# Patient Record
Sex: MALE | Race: WHITE | NOT HISPANIC OR LATINO | Employment: FULL TIME
[De-identification: names, ages, dates, MRNs, and addresses within clinical notes are randomized per-mention and may not be internally consistent; named-entity substitution may affect disease eponyms.]

---

## 2019-11-04 ENCOUNTER — HEALTH MAINTENANCE LETTER (OUTPATIENT)
Age: 34
End: 2019-11-04

## 2020-11-22 ENCOUNTER — HEALTH MAINTENANCE LETTER (OUTPATIENT)
Age: 35
End: 2020-11-22

## 2021-09-18 ENCOUNTER — HEALTH MAINTENANCE LETTER (OUTPATIENT)
Age: 36
End: 2021-09-18

## 2021-12-05 ASSESSMENT — ANXIETY QUESTIONNAIRES
6. BECOMING EASILY ANNOYED OR IRRITABLE: NOT AT ALL
3. WORRYING TOO MUCH ABOUT DIFFERENT THINGS: NOT AT ALL
5. BEING SO RESTLESS THAT IT IS HARD TO SIT STILL: NOT AT ALL
7. FEELING AFRAID AS IF SOMETHING AWFUL MIGHT HAPPEN: NOT AT ALL
GAD7 TOTAL SCORE: 1
2. NOT BEING ABLE TO STOP OR CONTROL WORRYING: NOT AT ALL
1. FEELING NERVOUS, ANXIOUS, OR ON EDGE: SEVERAL DAYS
4. TROUBLE RELAXING: NOT AT ALL
7. FEELING AFRAID AS IF SOMETHING AWFUL MIGHT HAPPEN: NOT AT ALL

## 2021-12-05 ASSESSMENT — PATIENT HEALTH QUESTIONNAIRE - PHQ9
SUM OF ALL RESPONSES TO PHQ QUESTIONS 1-9: 0
SUM OF ALL RESPONSES TO PHQ QUESTIONS 1-9: 0

## 2021-12-06 ENCOUNTER — VIRTUAL VISIT (OUTPATIENT)
Dept: FAMILY MEDICINE | Facility: CLINIC | Age: 36
End: 2021-12-06
Payer: COMMERCIAL

## 2021-12-06 DIAGNOSIS — F41.9 ANXIETY: Primary | ICD-10-CM

## 2021-12-06 DIAGNOSIS — I45.6 WPW (WOLFF-PARKINSON-WHITE SYNDROME): ICD-10-CM

## 2021-12-06 PROBLEM — K21.9 GASTRIC REFLUX: Status: ACTIVE | Noted: 2019-05-10

## 2021-12-06 PROCEDURE — 99203 OFFICE O/P NEW LOW 30 MIN: CPT | Mod: GT | Performed by: NURSE PRACTITIONER

## 2021-12-06 RX ORDER — BUSPIRONE HYDROCHLORIDE 10 MG/1
10 TABLET ORAL 2 TIMES DAILY
COMMUNITY
Start: 2020-07-22 | End: 2021-12-06

## 2021-12-06 RX ORDER — PROPRANOLOL HYDROCHLORIDE 10 MG/1
10 TABLET ORAL DAILY PRN
Qty: 90 TABLET | Refills: 3 | Status: SHIPPED | OUTPATIENT
Start: 2021-12-06 | End: 2023-04-25

## 2021-12-06 RX ORDER — PROPRANOLOL HYDROCHLORIDE 10 MG/1
10 TABLET ORAL
COMMUNITY
Start: 2021-02-18 | End: 2023-04-25

## 2021-12-06 RX ORDER — BUSPIRONE HYDROCHLORIDE 10 MG/1
10 TABLET ORAL 2 TIMES DAILY
Qty: 180 TABLET | Refills: 3 | Status: SHIPPED | OUTPATIENT
Start: 2021-12-06 | End: 2022-12-02

## 2021-12-06 RX ORDER — PROPRANOLOL HYDROCHLORIDE 10 MG/1
10 TABLET ORAL 2 TIMES DAILY PRN
Qty: 180 TABLET | Refills: 3 | Status: SHIPPED | OUTPATIENT
Start: 2021-12-06 | End: 2021-12-06

## 2021-12-06 RX ORDER — PROPRANOLOL HYDROCHLORIDE 10 MG/1
10 TABLET ORAL 2 TIMES DAILY
COMMUNITY
Start: 2020-08-12 | End: 2021-12-06

## 2021-12-06 ASSESSMENT — PATIENT HEALTH QUESTIONNAIRE - PHQ9: SUM OF ALL RESPONSES TO PHQ QUESTIONS 1-9: 0

## 2021-12-06 ASSESSMENT — ANXIETY QUESTIONNAIRES: GAD7 TOTAL SCORE: 1

## 2021-12-06 NOTE — PROGRESS NOTES
Avery is a 36 year old who is being evaluated via a billable video visit.      How would you like to obtain your AVS? MyChart  If the video visit is dropped, the invitation should be resent by: Text to cell phone: [phone]  Will anyone else be joining your video visit? No      Video Start Time: 2:07 PM    Assessment & Plan     WPW (Sylvie-Parkinson-White syndrome)  Ablation in 2019.  No recurring symptoms.     Anxiety  Chronic, well controlled.  Continue current medications.   - propranolol (INDERAL) 10 MG tablet; Take 1 tablet (10 mg) by mouth 2 times daily as needed (anxiety)  - busPIRone (BUSPAR) 10 MG tablet; Take 1 tablet (10 mg) by mouth 2 times daily               Return in about 4 weeks (around 1/3/2022) for Preventive Visit, In-Clinic Visit.    [facility]    Cecy Varela is a 36 year old who presents for the following health issues     History of Present Illness       Mental Health Follow-up:  Patient presents to follow-up on Anxiety.    Patient's anxiety since last visit has been:  Good  The patient is not having other symptoms associated with anxiety.  Any significant life events: No  Patient is not feeling anxious or having panic attacks.  Patient has no concerns about alcohol or drug use.     Social History  Tobacco Use    Smoking status: Not on file    Smokeless tobacco: Not on file  Alcohol use: Not on file  Drug use: Not on file      Today's PHQ-9         PHQ-9 Total Score:     (P) 0   PHQ-9 Q9 Thoughts of better off dead/self-harm past 2 weeks :   (P) Not at all   Thoughts of suicide or self harm:      Self-harm Plan:        Self-harm Action:          Safety concerns for self or others:           He eats 2-3 servings of fruits and vegetables daily.He consumes 0 sweetened beverage(s) daily.He exercises with enough effort to increase his heart rate 30 to 60 minutes per day.  He exercises with enough effort to increase his heart rate 6  days per week.   He is taking medications regularly.       Anxiety Follow-Up    How are you doing with your anxiety since your last visit? Improved     Are you having other symptoms that might be associated with anxiety? No    Have you had a significant life event? No     Are you feeling depressed? No    Do you have any concerns with your use of alcohol or other drugs? No    Social History     Tobacco Use     Smoking status: Never Smoker     Smokeless tobacco: Former User     Tobacco comment: Date is an estimate for smokeless.   Vaping Use     Vaping Use: Never used   Substance Use Topics     Alcohol use: Yes     Comment: very light      Drug use: Never     WON-7 SCORE 12/5/2021   Total Score 1 (minimal anxiety)   Total Score 1     PHQ 12/5/2021   PHQ-9 Total Score 0   Q9: Thoughts of better off dead/self-harm past 2 weeks Not at all     Last PHQ-9 12/5/2021   1.  Little interest or pleasure in doing things 0   2.  Feeling down, depressed, or hopeless 0   3.  Trouble falling or staying asleep, or sleeping too much 0   4.  Feeling tired or having little energy 0   5.  Poor appetite or overeating 0   6.  Feeling bad about yourself 0   7.  Trouble concentrating 0   8.  Moving slowly or restless 0   Q9: Thoughts of better off dead/self-harm past 2 weeks 0   PHQ-9 Total Score 0     WON-7  12/5/2021   1. Feeling nervous, anxious, or on edge 1   2. Not being able to stop or control worrying 0   3. Worrying too much about different things 0   4. Trouble relaxing 0   5. Being so restless that it is hard to sit still 0   6. Becoming easily annoyed or irritable 0   7. Feeling afraid, as if something awful might happen 0   WON-7 Total Score 1     Previous care in [place].  Moved back to [place] last year.  Diagnosed with anxiety in the past.  Taking buspar and propranolol.  He finds anxiety is well controlled with medication.  He only uses propranolol during the week. Has been given propranolol to use twice daily, but only using  "once a day.  Needs refills.       Review of Systems   Constitutional, HEENT, cardiovascular, pulmonary, gi and gu systems are negative, except as otherwise noted.      Objective    Vitals - Patient Reported  Weight (Patient Reported): 88.5 kg (195 lb)  Height (Patient Reported): 185.4 cm (6' 1\")  BMI (Based on Pt Reported Ht/Wt): 25.73  Pain Score: No Pain (0)      Vitals:  No vitals were obtained today due to virtual visit.    Physical Exam   GENERAL: Healthy, alert and no distress  EYES: Eyes grossly normal to inspection.  No discharge or erythema, or obvious scleral/conjunctival abnormalities.  RESP: No audible wheeze, cough, or visible cyanosis.  No visible retractions or increased work of breathing.    SKIN: Visible skin clear. No significant rash, abnormal pigmentation or lesions.  NEURO: Cranial nerves grossly intact.  Mentation and speech appropriate for age.  PSYCH: Mentation appears normal, affect normal/bright, judgement and insight intact, normal speech and appearance well-groomed.    No results found for this or any previous visit (from the past 24 hour(s)).            Video-Visit Details    Type of service:  Video Visit    Video End Time:2:13 PM    Originating Location (pt. Location): Home    Distant Location (provider location):  [facility]     Platform used for Video Visit: Pedro  "

## 2022-01-08 ENCOUNTER — HEALTH MAINTENANCE LETTER (OUTPATIENT)
Age: 37
End: 2022-01-08

## 2022-11-20 ENCOUNTER — HEALTH MAINTENANCE LETTER (OUTPATIENT)
Age: 37
End: 2022-11-20

## 2022-12-02 DIAGNOSIS — F41.9 ANXIETY: ICD-10-CM

## 2022-12-02 RX ORDER — BUSPIRONE HYDROCHLORIDE 10 MG/1
TABLET ORAL
Qty: 180 TABLET | Refills: 0 | Status: SHIPPED | OUTPATIENT
Start: 2022-12-02 | End: 2023-03-02

## 2022-12-27 ENCOUNTER — OFFICE VISIT (OUTPATIENT)
Dept: FAMILY MEDICINE | Facility: CLINIC | Age: 37
End: 2022-12-27
Payer: COMMERCIAL

## 2022-12-27 VITALS
DIASTOLIC BLOOD PRESSURE: 81 MMHG | OXYGEN SATURATION: 99 % | BODY MASS INDEX: 26.55 KG/M2 | HEIGHT: 72 IN | RESPIRATION RATE: 15 BRPM | SYSTOLIC BLOOD PRESSURE: 140 MMHG | HEART RATE: 65 BPM | WEIGHT: 196 LBS | TEMPERATURE: 97.1 F

## 2022-12-27 DIAGNOSIS — R10.12 LUQ ABDOMINAL PAIN: ICD-10-CM

## 2022-12-27 DIAGNOSIS — K29.00 ACUTE GASTRITIS WITHOUT HEMORRHAGE, UNSPECIFIED GASTRITIS TYPE: Primary | ICD-10-CM

## 2022-12-27 DIAGNOSIS — R10.13 EPIGASTRIC PAIN: ICD-10-CM

## 2022-12-27 PROCEDURE — 99213 OFFICE O/P EST LOW 20 MIN: CPT | Performed by: FAMILY MEDICINE

## 2022-12-27 ASSESSMENT — PAIN SCALES - GENERAL: PAINLEVEL: MODERATE PAIN (4)
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
## 2023-03-02 DIAGNOSIS — F41.9 ANXIETY: ICD-10-CM

## 2023-03-02 RX ORDER — PROPRANOLOL HYDROCHLORIDE 10 MG/1
10 TABLET ORAL 2 TIMES DAILY PRN
Qty: 60 TABLET | Refills: 0 | Status: SHIPPED | OUTPATIENT
Start: 2023-03-02 | End: 2023-03-29

## 2023-03-02 RX ORDER — BUSPIRONE HYDROCHLORIDE 10 MG/1
10 TABLET ORAL 2 TIMES DAILY
Qty: 60 TABLET | Refills: 0 | Status: SHIPPED | OUTPATIENT
Start: 2023-03-02 | End: 2023-03-29

## 2023-03-02 NOTE — TELEPHONE ENCOUNTER
Was due for in person appt but has not been seen yet.  Limited refill given; Must have in person appt for refills as he has never been seen in clinic by me.    Yamilka Mckee CNP

## 2023-03-02 NOTE — TELEPHONE ENCOUNTER
Routing refill request to provider for review/approval because:  Patient needs to be seen because it has been more than 1 year since last office visit.  Failing bp    Tori Schaefer RN

## 2023-03-28 DIAGNOSIS — F41.9 ANXIETY: ICD-10-CM

## 2023-03-29 RX ORDER — BUSPIRONE HYDROCHLORIDE 10 MG/1
10 TABLET ORAL 2 TIMES DAILY
Qty: 60 TABLET | Refills: 0 | Status: SHIPPED | OUTPATIENT
Start: 2023-03-29 | End: 2023-04-28

## 2023-03-29 RX ORDER — PROPRANOLOL HYDROCHLORIDE 10 MG/1
10 TABLET ORAL 2 TIMES DAILY PRN
Qty: 60 TABLET | Refills: 0 | Status: SHIPPED | OUTPATIENT
Start: 2023-03-29 | End: 2023-04-25

## 2023-03-29 NOTE — TELEPHONE ENCOUNTER
Last refill until visit   Prescription approved per [facility] Refill Protocol.  Nayeli Ball RN, BSN  [facility]

## 2023-04-15 ENCOUNTER — HEALTH MAINTENANCE LETTER (OUTPATIENT)
Age: 38
End: 2023-04-15

## 2023-04-25 ENCOUNTER — OFFICE VISIT (OUTPATIENT)
Dept: FAMILY MEDICINE | Facility: CLINIC | Age: 38
End: 2023-04-25
Payer: COMMERCIAL

## 2023-04-25 VITALS
SYSTOLIC BLOOD PRESSURE: 124 MMHG | HEART RATE: 56 BPM | OXYGEN SATURATION: 99 % | RESPIRATION RATE: 16 BRPM | BODY MASS INDEX: 26.55 KG/M2 | DIASTOLIC BLOOD PRESSURE: 77 MMHG | HEIGHT: 72 IN | WEIGHT: 196 LBS | TEMPERATURE: 97.8 F

## 2023-04-25 DIAGNOSIS — Z11.59 NEED FOR HEPATITIS C SCREENING TEST: ICD-10-CM

## 2023-04-25 DIAGNOSIS — Z11.4 SCREENING FOR HIV (HUMAN IMMUNODEFICIENCY VIRUS): ICD-10-CM

## 2023-04-25 DIAGNOSIS — F41.8 PERFORMANCE ANXIETY: ICD-10-CM

## 2023-04-25 DIAGNOSIS — Z00.00 ROUTINE GENERAL MEDICAL EXAMINATION AT A HEALTH CARE FACILITY: Primary | ICD-10-CM

## 2023-04-25 DIAGNOSIS — F41.9 ANXIETY: ICD-10-CM

## 2023-04-25 DIAGNOSIS — I45.6 WPW (WOLFF-PARKINSON-WHITE SYNDROME): ICD-10-CM

## 2023-04-25 DIAGNOSIS — Z13.1 SCREENING FOR DIABETES MELLITUS: ICD-10-CM

## 2023-04-25 LAB
CHOLEST SERPL-MCNC: 143 MG/DL
FASTING STATUS PATIENT QL REPORTED: YES
GLUCOSE SERPL-MCNC: 83 MG/DL (ref 70–99)
HCV AB SERPL QL IA: NONREACTIVE
HDLC SERPL-MCNC: 45 MG/DL
HIV 1+2 AB+HIV1 P24 AG SERPL QL IA: NONREACTIVE
LDLC SERPL CALC-MCNC: 58 MG/DL
NONHDLC SERPL-MCNC: 98 MG/DL
TRIGL SERPL-MCNC: 198 MG/DL

## 2023-04-25 PROCEDURE — 90471 IMMUNIZATION ADMIN: CPT | Performed by: STUDENT IN AN ORGANIZED HEALTH CARE EDUCATION/TRAINING PROGRAM

## 2023-04-25 PROCEDURE — 36415 COLL VENOUS BLD VENIPUNCTURE: CPT | Performed by: STUDENT IN AN ORGANIZED HEALTH CARE EDUCATION/TRAINING PROGRAM

## 2023-04-25 PROCEDURE — 80061 LIPID PANEL: CPT | Performed by: STUDENT IN AN ORGANIZED HEALTH CARE EDUCATION/TRAINING PROGRAM

## 2023-04-25 PROCEDURE — 99395 PREV VISIT EST AGE 18-39: CPT | Mod: 25 | Performed by: STUDENT IN AN ORGANIZED HEALTH CARE EDUCATION/TRAINING PROGRAM

## 2023-04-25 PROCEDURE — 86803 HEPATITIS C AB TEST: CPT | Performed by: STUDENT IN AN ORGANIZED HEALTH CARE EDUCATION/TRAINING PROGRAM

## 2023-04-25 PROCEDURE — 90715 TDAP VACCINE 7 YRS/> IM: CPT | Performed by: STUDENT IN AN ORGANIZED HEALTH CARE EDUCATION/TRAINING PROGRAM

## 2023-04-25 PROCEDURE — 82947 ASSAY GLUCOSE BLOOD QUANT: CPT | Performed by: STUDENT IN AN ORGANIZED HEALTH CARE EDUCATION/TRAINING PROGRAM

## 2023-04-25 PROCEDURE — 99213 OFFICE O/P EST LOW 20 MIN: CPT | Mod: 25 | Performed by: STUDENT IN AN ORGANIZED HEALTH CARE EDUCATION/TRAINING PROGRAM

## 2023-04-25 PROCEDURE — 87389 HIV-1 AG W/HIV-1&-2 AB AG IA: CPT | Performed by: STUDENT IN AN ORGANIZED HEALTH CARE EDUCATION/TRAINING PROGRAM

## 2023-04-25 RX ORDER — PROPRANOLOL HYDROCHLORIDE 10 MG/1
10 TABLET ORAL 2 TIMES DAILY PRN
Qty: 180 TABLET | Refills: 3 | Status: SHIPPED | OUTPATIENT
Start: 2023-04-25 | End: 2024-04-24

## 2023-04-25 RX ORDER — BUSPIRONE HYDROCHLORIDE 15 MG/1
15 TABLET ORAL 2 TIMES DAILY
Qty: 180 TABLET | Refills: 0 | Status: SHIPPED | OUTPATIENT
Start: 2023-04-25 | End: 2023-08-03

## 2023-04-25 RX ORDER — PROPRANOLOL HYDROCHLORIDE 20 MG/1
20 TABLET ORAL SEE ADMIN INSTRUCTIONS
Qty: 30 TABLET | Refills: 1 | Status: SHIPPED | OUTPATIENT
Start: 2023-04-25 | End: 2023-06-16

## 2023-04-25 RX ORDER — BUSPIRONE HYDROCHLORIDE 10 MG/1
10 TABLET ORAL 2 TIMES DAILY
Qty: 60 TABLET | Refills: 0 | Status: CANCELLED | OUTPATIENT
Start: 2023-04-25

## 2023-04-25 ASSESSMENT — ANXIETY QUESTIONNAIRES
IF YOU CHECKED OFF ANY PROBLEMS ON THIS QUESTIONNAIRE, HOW DIFFICULT HAVE THESE PROBLEMS MADE IT FOR YOU TO DO YOUR WORK, TAKE CARE OF THINGS AT HOME, OR GET ALONG WITH OTHER PEOPLE: SOMEWHAT DIFFICULT
3. WORRYING TOO MUCH ABOUT DIFFERENT THINGS: NOT AT ALL
GAD7 TOTAL SCORE: 3
5. BEING SO RESTLESS THAT IT IS HARD TO SIT STILL: NOT AT ALL
2. NOT BEING ABLE TO STOP OR CONTROL WORRYING: SEVERAL DAYS
1. FEELING NERVOUS, ANXIOUS, OR ON EDGE: SEVERAL DAYS
7. FEELING AFRAID AS IF SOMETHING AWFUL MIGHT HAPPEN: NOT AT ALL
GAD7 TOTAL SCORE: 3
6. BECOMING EASILY ANNOYED OR IRRITABLE: NOT AT ALL

## 2023-04-25 ASSESSMENT — ENCOUNTER SYMPTOMS
CONSTIPATION: 0
ARTHRALGIAS: 0
HEADACHES: 1
ABDOMINAL PAIN: 0
WEAKNESS: 0
NAUSEA: 0
HEMATOCHEZIA: 0
EYE PAIN: 0
JOINT SWELLING: 0
PALPITATIONS: 0
NERVOUS/ANXIOUS: 0
FREQUENCY: 0
COUGH: 0
SORE THROAT: 0
CHILLS: 0
DIZZINESS: 0
SHORTNESS OF BREATH: 0
HEMATURIA: 0
HEARTBURN: 0
DIARRHEA: 0
MYALGIAS: 0
PARESTHESIAS: 0
FEVER: 0
DYSURIA: 0

## 2023-04-25 ASSESSMENT — PAIN SCALES - GENERAL: PAINLEVEL: NO PAIN (0)

## 2023-04-25 ASSESSMENT — PATIENT HEALTH QUESTIONNAIRE - PHQ9: 5. POOR APPETITE OR OVEREATING: SEVERAL DAYS
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
## 2023-04-26 DIAGNOSIS — F41.9 ANXIETY: ICD-10-CM

## 2023-04-28 RX ORDER — PROPRANOLOL HYDROCHLORIDE 10 MG/1
TABLET ORAL
Qty: 60 TABLET | OUTPATIENT
Start: 2023-04-28

## 2023-04-28 RX ORDER — BUSPIRONE HYDROCHLORIDE 10 MG/1
10 TABLET ORAL 2 TIMES DAILY
Qty: 60 TABLET | Refills: 0 | Status: SHIPPED | OUTPATIENT
Start: 2023-04-28 | End: 2023-05-15

## 2023-04-28 NOTE — TELEPHONE ENCOUNTER
Prescription approved per [facility] Refill Protocol.    Kathy SMITH RN   [facility] [place]

## 2023-04-28 NOTE — TELEPHONE ENCOUNTER
Already approved propranolol (INDERAL) 10 MG tablet (180 tablets with 3 refills on 4/25/2023). Request already responded to by other means.     Kathy SMITH RN   [facility]

## 2023-05-12 DIAGNOSIS — F41.9 ANXIETY: ICD-10-CM

## 2023-05-15 RX ORDER — BUSPIRONE HYDROCHLORIDE 10 MG/1
10 TABLET ORAL 2 TIMES DAILY
Qty: 180 TABLET | Refills: 3 | Status: SHIPPED | OUTPATIENT
Start: 2023-05-15 | End: 2023-06-13

## 2023-06-13 ENCOUNTER — OFFICE VISIT (OUTPATIENT)
Dept: PEDIATRICS | Facility: CLINIC | Age: 38
End: 2023-06-13
Payer: COMMERCIAL

## 2023-06-13 VITALS
HEART RATE: 53 BPM | TEMPERATURE: 97.4 F | BODY MASS INDEX: 26.87 KG/M2 | RESPIRATION RATE: 16 BRPM | WEIGHT: 198.4 LBS | HEIGHT: 72 IN | OXYGEN SATURATION: 100 % | SYSTOLIC BLOOD PRESSURE: 132 MMHG | DIASTOLIC BLOOD PRESSURE: 88 MMHG

## 2023-06-13 DIAGNOSIS — R76.8 POSITIVE ANA (ANTINUCLEAR ANTIBODY): ICD-10-CM

## 2023-06-13 DIAGNOSIS — R68.2 DRY MOUTH: Primary | ICD-10-CM

## 2023-06-13 LAB
ANION GAP SERPL CALCULATED.3IONS-SCNC: 11 MMOL/L (ref 7–15)
BUN SERPL-MCNC: 16.8 MG/DL (ref 6–20)
CALCIUM SERPL-MCNC: 9.5 MG/DL (ref 8.6–10)
CHLORIDE SERPL-SCNC: 102 MMOL/L (ref 98–107)
CREAT SERPL-MCNC: 1.09 MG/DL (ref 0.67–1.17)
DEPRECATED HCO3 PLAS-SCNC: 25 MMOL/L (ref 22–29)
ERYTHROCYTE [SEDIMENTATION RATE] IN BLOOD BY WESTERGREN METHOD: 5 MM/HR (ref 0–15)
GFR SERPL CREATININE-BSD FRML MDRD: 90 ML/MIN/1.73M2
GLUCOSE SERPL-MCNC: 98 MG/DL (ref 70–99)
POTASSIUM SERPL-SCNC: 4.9 MMOL/L (ref 3.4–5.3)
SODIUM SERPL-SCNC: 138 MMOL/L (ref 136–145)
TSH SERPL DL<=0.005 MIU/L-ACNC: 0.87 UIU/ML (ref 0.3–4.2)

## 2023-06-13 PROCEDURE — 86146 BETA-2 GLYCOPROTEIN ANTIBODY: CPT | Performed by: PHYSICIAN ASSISTANT

## 2023-06-13 PROCEDURE — 86235 NUCLEAR ANTIGEN ANTIBODY: CPT | Mod: 59 | Performed by: PHYSICIAN ASSISTANT

## 2023-06-13 PROCEDURE — 86039 ANTINUCLEAR ANTIBODIES (ANA): CPT | Performed by: PHYSICIAN ASSISTANT

## 2023-06-13 PROCEDURE — 86038 ANTINUCLEAR ANTIBODIES: CPT | Performed by: PHYSICIAN ASSISTANT

## 2023-06-13 PROCEDURE — 80048 BASIC METABOLIC PNL TOTAL CA: CPT | Performed by: PHYSICIAN ASSISTANT

## 2023-06-13 PROCEDURE — 84443 ASSAY THYROID STIM HORMONE: CPT | Performed by: PHYSICIAN ASSISTANT

## 2023-06-13 PROCEDURE — 86431 RHEUMATOID FACTOR QUANT: CPT | Performed by: PHYSICIAN ASSISTANT

## 2023-06-13 PROCEDURE — 85652 RBC SED RATE AUTOMATED: CPT | Performed by: PHYSICIAN ASSISTANT

## 2023-06-13 PROCEDURE — 86147 CARDIOLIPIN ANTIBODY EA IG: CPT | Performed by: PHYSICIAN ASSISTANT

## 2023-06-13 PROCEDURE — 99213 OFFICE O/P EST LOW 20 MIN: CPT | Performed by: PHYSICIAN ASSISTANT

## 2023-06-13 PROCEDURE — 86225 DNA ANTIBODY NATIVE: CPT | Performed by: PHYSICIAN ASSISTANT

## 2023-06-13 PROCEDURE — 86160 COMPLEMENT ANTIGEN: CPT | Performed by: PHYSICIAN ASSISTANT

## 2023-06-13 PROCEDURE — 36415 COLL VENOUS BLD VENIPUNCTURE: CPT | Performed by: PHYSICIAN ASSISTANT

## 2023-06-13 ASSESSMENT — ANXIETY QUESTIONNAIRES
5. BEING SO RESTLESS THAT IT IS HARD TO SIT STILL: NOT AT ALL
2. NOT BEING ABLE TO STOP OR CONTROL WORRYING: SEVERAL DAYS
1. FEELING NERVOUS, ANXIOUS, OR ON EDGE: SEVERAL DAYS
GAD7 TOTAL SCORE: 4
GAD7 TOTAL SCORE: 4
IF YOU CHECKED OFF ANY PROBLEMS ON THIS QUESTIONNAIRE, HOW DIFFICULT HAVE THESE PROBLEMS MADE IT FOR YOU TO DO YOUR WORK, TAKE CARE OF THINGS AT HOME, OR GET ALONG WITH OTHER PEOPLE: SOMEWHAT DIFFICULT
3. WORRYING TOO MUCH ABOUT DIFFERENT THINGS: NOT AT ALL
4. TROUBLE RELAXING: SEVERAL DAYS
8. IF YOU CHECKED OFF ANY PROBLEMS, HOW DIFFICULT HAVE THESE MADE IT FOR YOU TO DO YOUR WORK, TAKE CARE OF THINGS AT HOME, OR GET ALONG WITH OTHER PEOPLE?: SOMEWHAT DIFFICULT
6. BECOMING EASILY ANNOYED OR IRRITABLE: NOT AT ALL
7. FEELING AFRAID AS IF SOMETHING AWFUL MIGHT HAPPEN: SEVERAL DAYS
7. FEELING AFRAID AS IF SOMETHING AWFUL MIGHT HAPPEN: SEVERAL DAYS

## 2023-06-13 ASSESSMENT — PAIN SCALES - GENERAL: PAINLEVEL: NO PAIN (0)

## 2023-06-13 NOTE — PROGRESS NOTES
Assessment & Plan      Diagnosis Comments   1. Dry mouth  SSA Ro KATIA Antibody IgG, SSB La KATIA Antibody IgG, Anti Nuclear Susanna IgG by IFA with Reflex, ESR: Erythrocyte sedimentation rate, TSH, Basic metabolic panel  (Ca, Cl, CO2, Creat, Gluc, K, Na, BUN), Rheumatoid factor     Labs pending.   Stay hydrated, avoid foods that increase thirst. Sugar free candies, gum, is already using Biotene   Follow up sooner if needed.                     BMI:   Estimated body mass index is 26.91 kg/m  as calculated from the following:    Height as of this encounter: 1.829 m (6').    Weight as of this encounter: 90 kg (198 lb 6.4 oz).           Inga Blanton PA-C  [facility] LORETTA Varela is a 37 year old, presenting for the following health issues:  Mouth Problem (Dry mouth) and Anxiety        6/13/2023    10:26 AM   Additional Questions   Roomed by Kierra   Accompanied by Self         6/13/2023    10:26 AM   Patient Reported Additional Medications   Patient reports taking the following new medications no     History of Present Illness       Mental Health Follow-up:  Patient presents to follow-up on Anxiety.    Patient's anxiety since last visit has been:  Good  The patient is having other symptoms associated with anxiety.  Any significant life events: other  Patient is feeling anxious or having panic attacks.  Patient has no concerns about alcohol or drug use.    Reason for visit:  Experiencing consistent sry mouth  Symptom onset:  3-4 weeks ago  Symptoms include:  Dry mouth  Symptom intensity:  Moderate  Symptom progression:  Staying the same  Had these symptoms before:  Yes  Has tried/received treatment for these symptoms:  No    He eats 2-3 servings of fruits and vegetables daily.He consumes 0 sweetened beverage(s) daily.He exercises with enough effort to increase his heart rate 30 to 60 minutes per day.  He exercises with enough effort to increase his heart rate 6 days per week.   He is taking  medications regularly.  Today's WON-7 Score: 4         1. Here for concerns about consistent dry mouth. Some days are worse then others. Sometimes very dry, makes talking difficult. Worsening, was eating sandwich and needed water to get it down.  First noted about April, but in last four weeks more consistent.    Is taking Buspar started a few years ago, recent dose increase. Has been helpful  propanol has been for some time, uses daily. Has been helpful      No dry skin, dry eyes, dental issues. No post nasal drip issues. Uses acid medication as needed, zantac. Not new.    No family hx of similar.   Denies dry skin, rashes, constipation.             Review of Systems         Objective    /88 (BP Location: Right arm, Patient Position: Chair, Cuff Size: Adult Regular)   Pulse 53   Temp 97.4  F (36.3  C) (Tympanic)   Resp 16   Ht 1.829 m (6')   Wt 90 kg (198 lb 6.4 oz)   SpO2 100%   BMI 26.91 kg/m    Body mass index is 26.91 kg/m .  Physical Exam   GENERAL: healthy, alert and no distress  EYES: Eyes grossly normal to inspection, PERRL and conjunctivae and sclerae normal  HENT: ear canals and TM's normal, nose and mouth without ulcers or lesions  NECK: no adenopathy, no asymmetry, masses, or scars and thyroid normal to palpation  RESP: lungs clear to auscultation - no rales, rhonchi or wheezes  CV: regular rate and rhythm, normal S1 S2, no S3 or S4, no murmur, click or rub, no peripheral edema and peripheral pulses strong

## 2023-06-14 DIAGNOSIS — F41.8 PERFORMANCE ANXIETY: ICD-10-CM

## 2023-06-14 LAB
ANA PAT SER IF-IMP: ABNORMAL
ANA SER QL IF: POSITIVE
ANA TITR SER IF: ABNORMAL {TITER}
ENA SS-A AB SER IA-ACNC: <0.5 U/ML
ENA SS-A AB SER IA-ACNC: NEGATIVE
ENA SS-B IGG SER IA-ACNC: 0.8 U/ML
ENA SS-B IGG SER IA-ACNC: NEGATIVE
RHEUMATOID FACT SER NEPH-ACNC: <7 IU/ML

## 2023-06-15 LAB
C3 SERPL-MCNC: 101 MG/DL (ref 81–157)
C4 SERPL-MCNC: 22 MG/DL (ref 13–39)

## 2023-06-16 RX ORDER — PROPRANOLOL HYDROCHLORIDE 20 MG/1
TABLET ORAL
Qty: 30 TABLET | Refills: 0 | Status: SHIPPED | OUTPATIENT
Start: 2023-06-16 | End: 2024-05-13

## 2023-06-16 NOTE — TELEPHONE ENCOUNTER
Prescription approved per [facility] Refill Protocol.      Linda Hollis RN

## 2023-06-18 LAB
B2 GLYCOPROT1 IGG SERPL IA-ACNC: 1.9 U/ML
B2 GLYCOPROT1 IGM SERPL IA-ACNC: <2.4 U/ML
CARDIOLIPIN IGG SER IA-ACNC: 16 GPL-U/ML
CARDIOLIPIN IGG SER IA-ACNC: ABNORMAL
CARDIOLIPIN IGM SER IA-ACNC: 24 MPL-U/ML
CARDIOLIPIN IGM SER IA-ACNC: ABNORMAL
DSDNA AB SER-ACNC: 21 IU/ML

## 2023-06-19 ENCOUNTER — MYC MEDICAL ADVICE (OUTPATIENT)
Dept: PEDIATRICS | Facility: CLINIC | Age: 38
End: 2023-06-19
Payer: COMMERCIAL

## 2023-06-19 DIAGNOSIS — R76.8 POSITIVE ANA (ANTINUCLEAR ANTIBODY): Primary | ICD-10-CM

## 2023-06-19 DIAGNOSIS — R68.2 DRY MOUTH: ICD-10-CM

## 2023-06-21 NOTE — TELEPHONE ENCOUNTER
Patient sending MyC, is concerned with waiting until January when she can be seen by Rheumatology. Any recommendations in the meantime?     Reese MARTINES RN 6/21/2023 at 12:03 PM

## 2023-08-02 DIAGNOSIS — F41.9 ANXIETY: ICD-10-CM

## 2023-08-03 RX ORDER — BUSPIRONE HYDROCHLORIDE 15 MG/1
TABLET ORAL
Qty: 180 TABLET | Refills: 0 | Status: SHIPPED | OUTPATIENT
Start: 2023-08-03 | End: 2023-11-02

## 2023-08-03 NOTE — TELEPHONE ENCOUNTER
Prescription approved per [facility] Refill Protocol.    Yaquelin CHRISTIANSEN RN   PAL (Patient Advocate Liaison)  [facility]  [phone]

## 2023-11-02 DIAGNOSIS — F41.9 ANXIETY: ICD-10-CM

## 2023-11-02 RX ORDER — BUSPIRONE HYDROCHLORIDE 15 MG/1
TABLET ORAL
Qty: 180 TABLET | Refills: 1 | Status: SHIPPED | OUTPATIENT
Start: 2023-11-02 | End: 2024-05-06

## 2023-11-29 NOTE — TELEPHONE ENCOUNTER
Have never prescribed this medication in past for patient and not on medication list. Declining refill.     Darion Armijo MD  [facility]  11/29/2023

## 2024-01-02 ENCOUNTER — E-VISIT (OUTPATIENT)
Dept: URGENT CARE | Facility: CLINIC | Age: 39
End: 2024-01-02
Payer: COMMERCIAL

## 2024-01-02 DIAGNOSIS — R12 HEARTBURN: Primary | ICD-10-CM

## 2024-01-02 PROCEDURE — 99207 PR NON-BILLABLE SERV PER CHARTING: CPT | Performed by: NURSE PRACTITIONER

## 2024-01-13 NOTE — PATIENT INSTRUCTIONS
Thank you for choosing us for your care. Based on the information provided, I believe you need to be seen immediately.  Please go urgent care as soon as possible.     You will not be charged for this eVisit.

## 2024-01-17 ENCOUNTER — OFFICE VISIT (OUTPATIENT)
Dept: FAMILY MEDICINE | Facility: CLINIC | Age: 39
End: 2024-01-17
Payer: COMMERCIAL

## 2024-01-17 VITALS
DIASTOLIC BLOOD PRESSURE: 72 MMHG | BODY MASS INDEX: 28.04 KG/M2 | TEMPERATURE: 98.5 F | HEART RATE: 66 BPM | OXYGEN SATURATION: 97 % | SYSTOLIC BLOOD PRESSURE: 124 MMHG | HEIGHT: 72 IN | WEIGHT: 207 LBS | RESPIRATION RATE: 12 BRPM

## 2024-01-17 DIAGNOSIS — I78.1 NON-NEOPLASTIC NEVUS: Primary | ICD-10-CM

## 2024-01-17 PROCEDURE — 11300 SHAVE SKIN LESION 0.5 CM/<: CPT | Performed by: PHYSICIAN ASSISTANT

## 2024-01-17 PROCEDURE — 88305 TISSUE EXAM BY PATHOLOGIST: CPT | Performed by: PATHOLOGY

## 2024-01-17 RX ORDER — LIDOCAINE HYDROCHLORIDE AND EPINEPHRINE 10; 10 MG/ML; UG/ML
1 INJECTION, SOLUTION INFILTRATION; PERINEURAL ONCE
Status: COMPLETED | OUTPATIENT
Start: 2024-01-17 | End: 2024-01-17

## 2024-01-17 RX ADMIN — LIDOCAINE HYDROCHLORIDE AND EPINEPHRINE 1 ML: 10; 10 INJECTION, SOLUTION INFILTRATION; PERINEURAL at 17:05

## 2024-01-17 ASSESSMENT — ENCOUNTER SYMPTOMS
GASTROINTESTINAL NEGATIVE: 1
CARDIOVASCULAR NEGATIVE: 1
RESPIRATORY NEGATIVE: 1
CONSTITUTIONAL NEGATIVE: 1

## 2024-01-17 NOTE — PROGRESS NOTES
Assessment & Plan     Non-neoplastic nevus  On exam today he has a 0.5 x 0.5 cm mole to his right hip region.  It does look somewhat inflamed due to clothing rubbing on it.  He states it does not bleed.  Appears to be benign.  He would like this removed.  Simple shave biopsy was conducted today without any complications.  Specimen will be sent in for pathology.  We did clean the area well.  He is to watch for signs and symptoms of infection.  - NC INCISIONAL BIOPSY OF SKIN, FIRST/SINGLE LESION  - Surgical Pathology Exam        Procedure note    Date: January 17, 2024   Provider: Valdez Rojo PA-C  Name of procedure: Shave biopsy  Indication: Abnormal mole  Patient consent: Signed consent was in chart.  Procedure, benefits, risks including risks of bleeding, infection, injury, anesthesia, and allergies and alternative explanations to the patient who voiced understanding of the information.  Their questions were sought and answered.  Patient agrees to proceed with the procedure.    Description of procedure: The area was cleansed with alcohol wipe.  1 cc of 1% lidocaine with epinephrine was used to anesthetize the area.  Area was then cleansed with Betadine.  A blade was used to shave down the mole on right hip without any complications.  The area was cleansed with alcohol again and bandage was applied.  Anesthesia: 1 cc of 1% lidocaine with epinephrine  Complications: None  Estimated blood loss: None  Disposition: Patient alert and oriented and resting.  Patient tolerated procedure well.  Minimal blood loss.  Signs and symptoms of infection were discussed in detail.  Symptoms for prompt reevaluation were discussed.      Cecy Varela is a 38 year old, presenting for the following health issues:  Mole (Pt reports that he has a mole on his R side that gets irritated by rubbing on clothes. Was painful and bothersome but now has gotten better)        1/17/2024     4:24 PM   Additional Questions   Roomed by Cici  Osbaldo   Accompanied by mechelle Varela is a pleasant 38-year-old male who presents to the clinic today for evaluation on a mole to his right hip.  He states he has had this mole for some time.  Over the last few months it has become more irritated as it is along his belt line.  He states that it is catching on things.  He states that the mole has not changed in any way.  He would like it removed today as it continues to catch on clothing.    History of Present Illness       Reason for visit:  Mole that caused irritation    He eats 2-3 servings of fruits and vegetables daily.He consumes 0 sweetened beverage(s) daily.He exercises with enough effort to increase his heart rate 30 to 60 minutes per day.  He exercises with enough effort to increase his heart rate 6 days per week.   He is taking medications regularly.             Objective    /72 (BP Location: Right arm, Patient Position: Sitting, Cuff Size: Adult Regular)   Pulse 66   Temp 98.5  F (36.9  C) (Oral)   Resp 12   Ht 1.829 m (6')   Wt 93.9 kg (207 lb)   SpO2 97%   BMI 28.07 kg/m    Body mass index is 28.07 kg/m .  Review of Systems   Constitutional: Negative.    HENT: Negative.     Respiratory: Negative.     Cardiovascular: Negative.    Gastrointestinal: Negative.    Genitourinary: Negative.    Skin:         Mole to right hip      Physical Exam  Vitals and nursing note reviewed.   Constitutional:       Appearance: Normal appearance.   Eyes:      Conjunctiva/sclera: Conjunctivae normal.   Skin:     General: Skin is warm and dry.      Comments: 0.5 x 0.5 cm nevi to right hip.   Neurological:      General: No focal deficit present.      Mental Status: He is alert and oriented to person, place, and time.      Motor: No weakness.   Psychiatric:         Mood and Affect: Mood normal.         Behavior: Behavior normal.         Thought Content: Thought content normal.         Judgment: Judgment normal.                Signed Electronically by: Valdez BATISTA  RADHA Rojo

## 2024-01-19 LAB
PATH REPORT.COMMENTS IMP SPEC: NORMAL
PATH REPORT.COMMENTS IMP SPEC: NORMAL
PATH REPORT.FINAL DX SPEC: NORMAL
PATH REPORT.GROSS SPEC: NORMAL
PATH REPORT.RELEVANT HX SPEC: NORMAL
PHOTO IMAGE: NORMAL

## 2024-03-26 ENCOUNTER — PATIENT OUTREACH (OUTPATIENT)
Dept: CARE COORDINATION | Facility: CLINIC | Age: 39
End: 2024-03-26
Payer: COMMERCIAL

## 2024-04-09 ENCOUNTER — PATIENT OUTREACH (OUTPATIENT)
Dept: CARE COORDINATION | Facility: CLINIC | Age: 39
End: 2024-04-09
Payer: COMMERCIAL

## 2024-04-24 DIAGNOSIS — F41.9 ANXIETY: ICD-10-CM

## 2024-04-24 RX ORDER — PROPRANOLOL HYDROCHLORIDE 10 MG/1
10 TABLET ORAL 2 TIMES DAILY PRN
Qty: 180 TABLET | Refills: 0 | Status: SHIPPED | OUTPATIENT
Start: 2024-04-24

## 2024-05-06 DIAGNOSIS — F41.9 ANXIETY: ICD-10-CM

## 2024-05-06 RX ORDER — BUSPIRONE HYDROCHLORIDE 15 MG/1
TABLET ORAL
Qty: 180 TABLET | Refills: 0 | Status: SHIPPED | OUTPATIENT
Start: 2024-05-06

## 2024-05-13 ENCOUNTER — MYC REFILL (OUTPATIENT)
Dept: FAMILY MEDICINE | Facility: CLINIC | Age: 39
End: 2024-05-13
Payer: COMMERCIAL

## 2024-05-13 DIAGNOSIS — F41.8 PERFORMANCE ANXIETY: ICD-10-CM

## 2024-05-13 RX ORDER — PROPRANOLOL HYDROCHLORIDE 20 MG/1
20 TABLET ORAL PRN
Qty: 30 TABLET | Refills: 0 | Status: SHIPPED | OUTPATIENT
Start: 2024-05-13 | End: 2024-09-03

## 2024-05-13 NOTE — TELEPHONE ENCOUNTER
Pt needs to make an est care visit with me. Last seen for acute visit. Will refill med for 1 month.

## 2024-05-15 ENCOUNTER — OFFICE VISIT (OUTPATIENT)
Dept: FAMILY MEDICINE | Facility: CLINIC | Age: 39
End: 2024-05-15
Payer: COMMERCIAL

## 2024-05-15 ENCOUNTER — ANCILLARY PROCEDURE (OUTPATIENT)
Dept: GENERAL RADIOLOGY | Facility: CLINIC | Age: 39
End: 2024-05-15
Attending: PHYSICIAN ASSISTANT
Payer: COMMERCIAL

## 2024-05-15 VITALS
HEART RATE: 68 BPM | DIASTOLIC BLOOD PRESSURE: 68 MMHG | RESPIRATION RATE: 16 BRPM | HEIGHT: 72 IN | OXYGEN SATURATION: 97 % | BODY MASS INDEX: 27.09 KG/M2 | SYSTOLIC BLOOD PRESSURE: 120 MMHG | WEIGHT: 200 LBS | TEMPERATURE: 98.2 F

## 2024-05-15 DIAGNOSIS — M25.562 ACUTE PAIN OF LEFT KNEE: Primary | ICD-10-CM

## 2024-05-15 DIAGNOSIS — M25.562 ACUTE PAIN OF LEFT KNEE: ICD-10-CM

## 2024-05-15 PROCEDURE — 99213 OFFICE O/P EST LOW 20 MIN: CPT | Performed by: PHYSICIAN ASSISTANT

## 2024-05-15 PROCEDURE — 73562 X-RAY EXAM OF KNEE 3: CPT | Mod: TC | Performed by: RADIOLOGY

## 2024-05-15 RX ORDER — PREDNISONE 20 MG/1
40 TABLET ORAL DAILY
Qty: 10 TABLET | Refills: 0 | Status: SHIPPED | OUTPATIENT
Start: 2024-05-15 | End: 2024-05-20

## 2024-05-15 ASSESSMENT — ENCOUNTER SYMPTOMS
CHILLS: 0
JOINT SWELLING: 0
FATIGUE: 0
FEVER: 0
WEAKNESS: 0

## 2024-05-15 NOTE — PROGRESS NOTES
Assessment & Plan     Acute pain of left knee  Patient states that the pain started about 3 weeks ago after he completed 5 miles of jogging. He states that his left knee is painful and throbbing. He states the pain is on the medial and posterior side of his left knee.  Patient reports that he took some time off from jogging and noticed that biking does not cause any pain. He states that at rest his knee is better. He has iced it and also took Ibuprofen ad Tylenol pm. In clinic today, he describes the pain as dull and there is no swelling. He denies any instability in left knee, and denies any locking of left knee. On physical exam, there is no swelling or effusion present. Anterior drawer and posterior drawer test is negative. Valgus stress for medical instability is negative. Varus stress test for lateral instability is negative. I believe this could be tendinitis. We will obtain X-ray of his left knee. Will prescribe prednisone 20 mg to help decrease inflammation. Will follow up with results of X-ray. If not better in 2 weeks he is to call and update me. PT/MRI/injections would be next steps.   - XR Knee Left 3 Views; Future  - predniSONE (DELTASONE) 20 MG tablet; Take 2 tablets (40 mg) by mouth daily for 5 days          BMI  Estimated body mass index is 27.12 kg/m  as calculated from the following:    Height as of this encounter: 1.829 m (6').    Weight as of this encounter: 90.7 kg (200 lb).   Weight management plan: Discussed healthy diet and exercise guidelines        Subjective   Avery is a 38 year old, presenting for the following health issues:  Knee Pain (Pt reports L knee pain after jogging x 3-4 weeks. Painful and throbbing. Eventually the pain and stiffness goes away but consistently uncomfortable.)        5/15/2024    10:03 AM   Additional Questions   Roomed by Cici Roblero   Accompanied by mechelle     Avery is a pleasant 38 year old who presents to the clinic for left knee pain.     Patient states  that the pain started about 3 weeks ago after he completed 5 miles of jogging. He states that his left knee is painful and throbbing. He states the pain is on the medial and posterior side of his left knee.  Patient reports that he took some time off from jogging and noticed that biking does not cause any pain. He states that at rest his knee is better. He has iced it and also took Ibuprofen ad Tylenol pm. In clinic today, he describes the pain as dull and there is no swelling. He denies any instability in left knee, and denies any locking of left knee.     History of Present Illness       Reason for visit:  Knee pain  Symptom onset:  3-4 weeks ago  Symptoms include:  Pointed knee pain and general tightness  Symptom intensity:  Severe  Symptom progression:  Improving  Had these symptoms before:  No  What makes it worse:  Any exercise or bending involving compression  What makes it better:  Stretching and avoid certain exercises    He eats 2-3 servings of fruits and vegetables daily.He consumes 0 sweetened beverage(s) daily.He exercises with enough effort to increase his heart rate 30 to 60 minutes per day.  He exercises with enough effort to increase his heart rate 6 days per week.   He is taking medications regularly.               ..Review of Systems   Constitutional:  Negative for chills, fatigue and fever.   Musculoskeletal:  Negative for joint swelling.        Left knee pain x 3 weeks. No injury.    Neurological:  Negative for weakness.           Objective    /68 (BP Location: Right arm, Patient Position: Sitting, Cuff Size: Adult Regular)   Pulse 68   Temp 98.2  F (36.8  C) (Oral)   Resp 16   Ht 1.829 m (6')   Wt 90.7 kg (200 lb)   SpO2 97%   BMI 27.12 kg/m    Body mass index is 27.12 kg/m .  Physical Exam  Eyes:      Conjunctiva/sclera: Conjunctivae normal.   Musculoskeletal:         General: No swelling or tenderness.      Right lower leg: No edema.      Left lower leg: No edema.      Comments:  Left knee: Mild pain to the medial aspect of left knee.  No redness, warmth or swelling.  Rest of the exam to the knee is benign.   Skin:     Findings: No bruising.   Neurological:      Mental Status: He is alert.          I assessed this patient in the clinic with Pretty PET[employer] student.  I agree with the above note which summarizes my findings and current recommendations. I have reviewed all diagnostics noted and performed physical exam. Changes were made in the body of the note to achieve one comprehensive document.          Signed Electronically by: Valdez Rojo PA-C

## 2024-06-22 ENCOUNTER — HEALTH MAINTENANCE LETTER (OUTPATIENT)
Age: 39
End: 2024-06-22

## 2024-08-01 DIAGNOSIS — F41.9 ANXIETY: ICD-10-CM

## 2024-08-02 RX ORDER — BUSPIRONE HYDROCHLORIDE 15 MG/1
TABLET ORAL
Qty: 180 TABLET | Refills: 0 | OUTPATIENT
Start: 2024-08-02

## 2024-09-03 ENCOUNTER — MYC REFILL (OUTPATIENT)
Dept: FAMILY MEDICINE | Facility: CLINIC | Age: 39
End: 2024-09-03
Payer: COMMERCIAL

## 2024-09-03 DIAGNOSIS — F41.8 PERFORMANCE ANXIETY: ICD-10-CM

## 2024-09-03 RX ORDER — PROPRANOLOL HYDROCHLORIDE 20 MG/1
20 TABLET ORAL PRN
Qty: 30 TABLET | Refills: 2 | Status: SHIPPED | OUTPATIENT
Start: 2024-09-03

## 2024-10-22 ENCOUNTER — PATIENT OUTREACH (OUTPATIENT)
Dept: CARE COORDINATION | Facility: CLINIC | Age: 39
End: 2024-10-22
Payer: COMMERCIAL

## 2024-12-07 DIAGNOSIS — F41.8 PERFORMANCE ANXIETY: ICD-10-CM

## 2024-12-09 RX ORDER — PROPRANOLOL HCL 20 MG
TABLET ORAL
Qty: 30 TABLET | Refills: 0 | Status: SHIPPED | OUTPATIENT
Start: 2024-12-09

## 2025-01-13 DIAGNOSIS — F41.8 PERFORMANCE ANXIETY: ICD-10-CM

## 2025-01-13 RX ORDER — PROPRANOLOL HCL 20 MG
TABLET ORAL
Qty: 90 TABLET | Refills: 0 | Status: SHIPPED | OUTPATIENT
Start: 2025-01-13

## 2025-06-17 ENCOUNTER — MYC REFILL (OUTPATIENT)
Dept: FAMILY MEDICINE | Facility: CLINIC | Age: 40
End: 2025-06-17
Payer: COMMERCIAL

## 2025-06-17 DIAGNOSIS — F41.9 ANXIETY: ICD-10-CM

## 2025-06-17 DIAGNOSIS — F41.8 PERFORMANCE ANXIETY: ICD-10-CM

## 2025-06-17 RX ORDER — PROPRANOLOL HYDROCHLORIDE 10 MG/1
10 TABLET ORAL 2 TIMES DAILY PRN
Qty: 180 TABLET | Refills: 0 | OUTPATIENT
Start: 2025-06-17

## 2025-06-17 RX ORDER — PROPRANOLOL HCL 20 MG
20 TABLET ORAL DAILY
Qty: 30 TABLET | Refills: 0 | Status: SHIPPED | OUTPATIENT
Start: 2025-06-17

## 2025-06-17 RX ORDER — BUSPIRONE HYDROCHLORIDE 15 MG/1
15 TABLET ORAL 2 TIMES DAILY
Qty: 180 TABLET | Refills: 0 | OUTPATIENT
Start: 2025-06-17

## 2025-06-18 RX ORDER — BUSPIRONE HYDROCHLORIDE 15 MG/1
15 TABLET ORAL 2 TIMES DAILY
Qty: 60 TABLET | Refills: 0 | Status: SHIPPED | OUTPATIENT
Start: 2025-06-18 | End: 2025-08-17

## 2025-06-18 NOTE — TELEPHONE ENCOUNTER
Pt adv appt needed has set up appt for 6/30 8:10 am     Pt would also like burpar filled until appt.     Please adv

## 2025-06-30 ENCOUNTER — OFFICE VISIT (OUTPATIENT)
Dept: FAMILY MEDICINE | Facility: CLINIC | Age: 40
End: 2025-06-30
Payer: COMMERCIAL

## 2025-06-30 VITALS
OXYGEN SATURATION: 99 % | HEART RATE: 61 BPM | TEMPERATURE: 98.4 F | HEIGHT: 72 IN | RESPIRATION RATE: 14 BRPM | SYSTOLIC BLOOD PRESSURE: 116 MMHG | WEIGHT: 191 LBS | DIASTOLIC BLOOD PRESSURE: 72 MMHG | BODY MASS INDEX: 25.87 KG/M2

## 2025-06-30 DIAGNOSIS — I45.6 WPW (WOLFF-PARKINSON-WHITE SYNDROME): ICD-10-CM

## 2025-06-30 DIAGNOSIS — E78.1 HYPERTRIGLYCERIDEMIA: ICD-10-CM

## 2025-06-30 DIAGNOSIS — F41.9 ANXIETY: ICD-10-CM

## 2025-06-30 DIAGNOSIS — R76.8 POSITIVE DOUBLE STRANDED DNA ANTIBODY TEST: ICD-10-CM

## 2025-06-30 DIAGNOSIS — M25.50 MULTIPLE JOINT PAIN: ICD-10-CM

## 2025-06-30 DIAGNOSIS — G47.00 INSOMNIA, UNSPECIFIED TYPE: ICD-10-CM

## 2025-06-30 DIAGNOSIS — R76.8 POSITIVE ANA (ANTINUCLEAR ANTIBODY): ICD-10-CM

## 2025-06-30 DIAGNOSIS — D84.9 IMMUNOCOMPROMISED STATE: ICD-10-CM

## 2025-06-30 DIAGNOSIS — F41.8 PERFORMANCE ANXIETY: ICD-10-CM

## 2025-06-30 DIAGNOSIS — Z00.00 ANNUAL PHYSICAL EXAM: Primary | ICD-10-CM

## 2025-06-30 DIAGNOSIS — M32.9 SYSTEMIC LUPUS ERYTHEMATOSUS, UNSPECIFIED SLE TYPE, UNSPECIFIED ORGAN INVOLVEMENT STATUS (H): ICD-10-CM

## 2025-06-30 LAB
ALBUMIN SERPL BCG-MCNC: 4.7 G/DL (ref 3.5–5.2)
ALP SERPL-CCNC: 50 U/L (ref 40–150)
ALT SERPL W P-5'-P-CCNC: 21 U/L (ref 0–70)
ANION GAP SERPL CALCULATED.3IONS-SCNC: 11 MMOL/L (ref 7–15)
AST SERPL W P-5'-P-CCNC: 25 U/L (ref 0–45)
BILIRUB SERPL-MCNC: 0.5 MG/DL
BUN SERPL-MCNC: 15.7 MG/DL (ref 6–20)
CALCIUM SERPL-MCNC: 9.6 MG/DL (ref 8.8–10.4)
CHLORIDE SERPL-SCNC: 102 MMOL/L (ref 98–107)
CHOLEST SERPL-MCNC: 159 MG/DL
CREAT SERPL-MCNC: 0.95 MG/DL (ref 0.67–1.17)
CRP SERPL-MCNC: <3 MG/L
EGFRCR SERPLBLD CKD-EPI 2021: >90 ML/MIN/1.73M2
ERYTHROCYTE [DISTWIDTH] IN BLOOD BY AUTOMATED COUNT: 11.6 % (ref 10–15)
ERYTHROCYTE [SEDIMENTATION RATE] IN BLOOD BY WESTERGREN METHOD: 5 MM/HR (ref 0–15)
FASTING STATUS PATIENT QL REPORTED: NO
FASTING STATUS PATIENT QL REPORTED: NO
GLUCOSE SERPL-MCNC: 92 MG/DL (ref 70–99)
HBV SURFACE AB SERPL IA-ACNC: >1000 M[IU]/ML
HBV SURFACE AB SERPL IA-ACNC: REACTIVE M[IU]/ML
HBV SURFACE AG SERPL QL IA: NONREACTIVE
HCO3 SERPL-SCNC: 26 MMOL/L (ref 22–29)
HCT VFR BLD AUTO: 42.6 % (ref 40–53)
HDLC SERPL-MCNC: 48 MG/DL
HGB BLD-MCNC: 14.5 G/DL (ref 13.3–17.7)
LDLC SERPL CALC-MCNC: 83 MG/DL
MCH RBC QN AUTO: 30 PG (ref 26.5–33)
MCHC RBC AUTO-ENTMCNC: 34 G/DL (ref 31.5–36.5)
MCV RBC AUTO: 88 FL (ref 78–100)
NONHDLC SERPL-MCNC: 111 MG/DL
PLATELET # BLD AUTO: 298 10E3/UL (ref 150–450)
POTASSIUM SERPL-SCNC: 4.6 MMOL/L (ref 3.4–5.3)
PROT SERPL-MCNC: 7.2 G/DL (ref 6.4–8.3)
RBC # BLD AUTO: 4.83 10E6/UL (ref 4.4–5.9)
RHEUMATOID FACT SERPL-ACNC: <10 IU/ML
SODIUM SERPL-SCNC: 139 MMOL/L (ref 135–145)
TRIGL SERPL-MCNC: 142 MG/DL
TSH SERPL DL<=0.005 MIU/L-ACNC: 0.81 UIU/ML (ref 0.3–4.2)
WBC # BLD AUTO: 6.3 10E3/UL (ref 4–11)

## 2025-06-30 PROCEDURE — G2211 COMPLEX E/M VISIT ADD ON: HCPCS | Performed by: PHYSICIAN ASSISTANT

## 2025-06-30 PROCEDURE — 86225 DNA ANTIBODY NATIVE: CPT | Performed by: PHYSICIAN ASSISTANT

## 2025-06-30 PROCEDURE — 99214 OFFICE O/P EST MOD 30 MIN: CPT | Mod: 25 | Performed by: PHYSICIAN ASSISTANT

## 2025-06-30 PROCEDURE — 85027 COMPLETE CBC AUTOMATED: CPT | Performed by: PHYSICIAN ASSISTANT

## 2025-06-30 PROCEDURE — 80061 LIPID PANEL: CPT | Performed by: PHYSICIAN ASSISTANT

## 2025-06-30 PROCEDURE — 86038 ANTINUCLEAR ANTIBODIES: CPT | Performed by: PHYSICIAN ASSISTANT

## 2025-06-30 PROCEDURE — 80053 COMPREHEN METABOLIC PANEL: CPT | Performed by: PHYSICIAN ASSISTANT

## 2025-06-30 PROCEDURE — 3074F SYST BP LT 130 MM HG: CPT | Performed by: PHYSICIAN ASSISTANT

## 2025-06-30 PROCEDURE — 86039 ANTINUCLEAR ANTIBODIES (ANA): CPT | Performed by: PHYSICIAN ASSISTANT

## 2025-06-30 PROCEDURE — 86431 RHEUMATOID FACTOR QUANT: CPT | Performed by: PHYSICIAN ASSISTANT

## 2025-06-30 PROCEDURE — 84443 ASSAY THYROID STIM HORMONE: CPT | Performed by: PHYSICIAN ASSISTANT

## 2025-06-30 PROCEDURE — 87340 HEPATITIS B SURFACE AG IA: CPT | Performed by: PHYSICIAN ASSISTANT

## 2025-06-30 PROCEDURE — 86706 HEP B SURFACE ANTIBODY: CPT | Performed by: PHYSICIAN ASSISTANT

## 2025-06-30 PROCEDURE — 99395 PREV VISIT EST AGE 18-39: CPT | Performed by: PHYSICIAN ASSISTANT

## 2025-06-30 PROCEDURE — 86200 CCP ANTIBODY: CPT | Performed by: PHYSICIAN ASSISTANT

## 2025-06-30 PROCEDURE — 85652 RBC SED RATE AUTOMATED: CPT | Performed by: PHYSICIAN ASSISTANT

## 2025-06-30 PROCEDURE — 3078F DIAST BP <80 MM HG: CPT | Performed by: PHYSICIAN ASSISTANT

## 2025-06-30 PROCEDURE — 86140 C-REACTIVE PROTEIN: CPT | Performed by: PHYSICIAN ASSISTANT

## 2025-06-30 PROCEDURE — 36415 COLL VENOUS BLD VENIPUNCTURE: CPT | Performed by: PHYSICIAN ASSISTANT

## 2025-06-30 RX ORDER — TRAZODONE HYDROCHLORIDE 50 MG/1
50 TABLET ORAL AT BEDTIME
Qty: 30 TABLET | Refills: 5 | Status: SHIPPED | OUTPATIENT
Start: 2025-06-30

## 2025-06-30 RX ORDER — PROPRANOLOL HYDROCHLORIDE 10 MG/1
TABLET ORAL
Qty: 90 TABLET | Refills: 0 | Status: SHIPPED | OUTPATIENT
Start: 2025-06-30

## 2025-06-30 RX ORDER — PROPRANOLOL HCL 20 MG
20 TABLET ORAL DAILY
Qty: 90 TABLET | Refills: 3 | Status: SHIPPED | OUTPATIENT
Start: 2025-06-30

## 2025-06-30 RX ORDER — BUSPIRONE HYDROCHLORIDE 15 MG/1
15 TABLET ORAL 2 TIMES DAILY
Qty: 180 TABLET | Refills: 3 | Status: SHIPPED | OUTPATIENT
Start: 2025-06-30

## 2025-06-30 SDOH — HEALTH STABILITY: PHYSICAL HEALTH: ON AVERAGE, HOW MANY MINUTES DO YOU ENGAGE IN EXERCISE AT THIS LEVEL?: 50 MIN

## 2025-06-30 SDOH — HEALTH STABILITY: PHYSICAL HEALTH: ON AVERAGE, HOW MANY DAYS PER WEEK DO YOU ENGAGE IN MODERATE TO STRENUOUS EXERCISE (LIKE A BRISK WALK)?: 5 DAYS

## 2025-06-30 ASSESSMENT — ENCOUNTER SYMPTOMS
PALPITATIONS: 0
HEMATURIA: 0
BACK PAIN: 0
ARTHRALGIAS: 0
COUGH: 0
SORE THROAT: 0
FEVER: 0
COLOR CHANGE: 0
CHILLS: 0
EYE PAIN: 0
SEIZURES: 0
SHORTNESS OF BREATH: 0
VOMITING: 0
ABDOMINAL PAIN: 0
DYSURIA: 0

## 2025-06-30 ASSESSMENT — SOCIAL DETERMINANTS OF HEALTH (SDOH): HOW OFTEN DO YOU GET TOGETHER WITH FRIENDS OR RELATIVES?: ONCE A WEEK

## 2025-06-30 NOTE — PROGRESS NOTES
Preventive Care Visit  [facility]  Valdez Rojo PA-C, Family Medicine  Jun 30, 2025      Assessment & Plan     Annual physical exam  Overall doing well.  Healthy lifestyle discussed.  Will update labs today  - CBC with platelets; Future    Anxiety  Anxiety is well-controlled on BuSpar 15 mg 2 times a day and propranolol as needed.  Will continue current dose at this time  - busPIRone (BUSPAR) 15 MG tablet; Take 1 tablet (15 mg) by mouth 2 times daily.  - propranolol (INDERAL) 10 MG tablet; Take 1 tablet (10mg) by mouth daily as needed.    WPW (Sylvie-Parkinson-White syndrome)  Diagnosed with Sylvie-Parkinson-White in 2011.  Status post ablation in 2019.  Underwent a Holter monitor which noted sinus rhythm with short TX intervals and also noted delta waves.  Underwent echo in 2019 which showed an EF of 55 to 60% negative ETT.  He denies any palpitations, lightheaded or dizziness.  Will continue propranolol 20 mg daily.  Saw cardiology in the past.  - Comprehensive metabolic panel (BMP + Alb, Alk Phos, ALT, AST, Total. Bili, TP); Future    Hypertriglyceridemia  Mild elevated triglyceride levels at 198 in 2023.  LDL was stable at 58.  He continues to watch diet and stay active.  Will update lipid panel today  - Lipid panel reflex to direct LDL Fasting; Future  Recent Labs   Lab Test 04/25/23  0811   CHOL 143   HDL 45   LDL 58   TRIG 198*     Performance anxiety  Using propranolol 10 mg as needed.  This is controlling his symptoms  - propranolol (INDERAL) 20 MG tablet; Take 1 tablet (20 mg) by mouth daily.    Positive LISS (antinuclear antibody)  Multiple joint pain  Family history of Lupus   He was seen in 2023 for headaches and dry mouth.  At that time he did undergo an autoimmune workup.  During that workup LISS was positive at 1:160, double-stranded DNA positive at 21.  SSA and SSB negative.  Subsequently followed up with rheumatology who was not overly concerned with his lab work and  symptoms and was not diagnosed with lupus during that time.  Over the last year or so he has been having more joint pain pain in his back, knees, and hips.  Knee pain is contributed to an injury and continues to do physical therapy.  With abnormal labs in 2023 and new onset joint pain will update autoimmune labs.  May benefit from seeing rheumatology again based on lab work.  Today we will check an LISS, CCP, rheumatoid factor, double-stranded DNA, CRP, sed rate, and TSH.  Does not note any joint swelling.  No abnormal rashes.  Of note his grandmother was diagnosed with lupus.  - Anti Nuclear Susanna IgG by IFA with Reflex; Future  - DNA double stranded antibodies; Future  - Rheumatoid factor; Future  - Cyclic Citrullinated Peptide Antibody IgG; Future  - CRP, inflammation; Future  - ESR: Erythrocyte sedimentation rate; Future  - TSH with free T4 reflex; Future    Insomnia, unspecified type  Intermittently struggles with insomnia.  States that he has difficulty shutting off his mind and falling asleep.  This is usually a few times a month.  Discussed medications and he would be interested in trialing something to help fall asleep.  We will trial him on trazodone 50 mg.  Side effects of the medication discussed.  - traZODone (DESYREL) 50 MG tablet; Take 1 tablet (50 mg) by mouth at bedtime.    Immunocompromised state  Unsure if he was immunized with hepatitis B as a child.  He did receive 1 dose of hepatitis B in 2014.  Will check titers today to see if he needs to repeat any further vaccines.  - Hepatitis B surface antigen; Future  - Hepatitis B Surface Antibody; Future    The longitudinal plan of care for the diagnosis(es)/condition(s) as documented were addressed during this visit. Due to the added complexity in care, I will continue to support Avery in the subsequent management and with ongoing continuity of care.      Patient has been advised of split billing requirements and indicates understanding: Yes      Follow-up Visit   Expected date:  Jun 30, 2026 (Approximate)      Follow Up Appointment Details:     Follow-up with whom?: Me    Follow-Up for what?: Adult Preventive    How?: In Person                     BMI  Estimated body mass index is 25.9 kg/m  as calculated from the following:    Height as of this encounter: 1.829 m (6').    Weight as of this encounter: 86.6 kg (191 lb).   Weight management plan: Discussed healthy diet and exercise guidelines  Reviewed preventive health counseling, as reflected in patient instructions  Counseling  Appropriate preventive services were addressed with this patient via screening, questionnaire, or discussion as appropriate for fall prevention, nutrition, physical activity, Tobacco-use cessation, social engagement, weight loss and cognition.  Checklist reviewing preventive services available has been given to the patient.  Reviewed patient's diet, addressing concerns and/or questions.   He is at risk for psychosocial distress and has been provided with information to reduce risk.         Follow-up    Follow-up Visit   Expected date:  Jun 30, 2026 (Approximate)      Follow Up Appointment Details:     Follow-up with whom?: Me    Follow-Up for what?: Adult Preventive    How?: In Person                 Cecy Varela is a 39 year old, presenting for the following:  Physical (Non Fasting Physical)        6/30/2025     8:22 AM   Additional Questions   Roomed by Cici Roblero   Accompanied by none          This is a very pleasant 39-year-old male presents to the clinic today for annual physical.  Past medical history significant for Sylvie-Parkinson-White, anxiety, GERD, and borderline positive LISS and positive double-stranded DNA.  Overall he is feeling well.  He is staying active and watching his diet.           Advance Care Planning    Discussed advance care planning with patient; however, patient declined at this time.        6/30/2025   General Health   How would you rate your  overall physical health? Good   Feel stress (tense, anxious, or unable to sleep) To some extent   (!) STRESS CONCERN      6/30/2025   Nutrition   Three or more servings of calcium each day? (!) I DON'T KNOW   Diet: Regular (no restrictions)   How many servings of fruit and vegetables per day? (!) 2-3   How many sweetened beverages each day? 0-1         6/30/2025   Exercise   Days per week of moderate/strenous exercise 5 days   Average minutes spent exercising at this level 50 min         6/30/2025   Social Factors   Frequency of gathering with friends or relatives Once a week   Worry food won't last until get money to buy more No   Food not last or not have enough money for food? No   Do you have housing? (Housing is defined as stable permanent housing and does not include staying outside in a car, in a tent, in an abandoned building, in an overnight shelter, or couch-surfing.) Yes   Are you worried about losing your housing? No   Lack of transportation? No   Unable to get utilities (heat,electricity)? No         6/30/2025   Dental   Dentist two times every year? Yes         Today's PHQ-2 Score:       6/30/2025     7:13 AM   PHQ-2 ( 1999 [employer])   Q1: Little interest or pleasure in doing things 0   Q2: Feeling down, depressed or hopeless 1   PHQ-2 Score 1    Q1: Little interest or pleasure in doing things Not at all   Q2: Feeling down, depressed or hopeless Several days   PHQ-2 Score 1       Patient-reported           6/30/2025   Substance Use   Alcohol more than 3/day or more than 7/wk No   Do you use any other substances recreationally? No     Social History     Tobacco Use    Smoking status: Never     Passive exposure: Never    Smokeless tobacco: Former     Quit date: 11/1/2008    Tobacco comments:     Date is an estimate for smokeless.   Vaping Use    Vaping status: Never Used   Substance Use Topics    Alcohol use: Yes     Comment: very light.    Drug use: Never           6/30/2025   STI Screening   New sexual  partner(s) since last STI/HIV test? No         6/30/2025   Contraception/Family Planning   Questions about contraception or family planning No        Reviewed and updated as needed this visit by Provider                    Past Medical History:   Diagnosis Date    WPW (Sylvie-Parkinson-White syndrome) 5/10/2019     Past Surgical History:   Procedure Laterality Date    CARDIAC SURGERY  August 2019    Ablation for WPW     BP Readings from Last 3 Encounters:   06/30/25 116/72   05/15/24 120/68   01/17/24 124/72    Wt Readings from Last 3 Encounters:   06/30/25 86.6 kg (191 lb)   05/15/24 90.7 kg (200 lb)   01/17/24 93.9 kg (207 lb)                  Patient Active Problem List   Diagnosis    Gastric reflux    WPW (Sylvie-Parkinson-White syndrome)    Anxiety    Systemic lupus erythematosus, unspecified SLE type, unspecified organ involvement status (H)     Past Surgical History:   Procedure Laterality Date    CARDIAC SURGERY  August 2019    Ablation for WPW       Social History     Tobacco Use    Smoking status: Never     Passive exposure: Never    Smokeless tobacco: Former     Quit date: 11/1/2008    Tobacco comments:     Date is an estimate for smokeless.   Substance Use Topics    Alcohol use: Yes     Comment: very light.     Family History   Problem Relation Age of Onset    Depression Father     Anxiety Disorder Father          Current Outpatient Medications   Medication Sig Dispense Refill    busPIRone (BUSPAR) 15 MG tablet Take 1 tablet (15 mg) by mouth 2 times daily. 180 tablet 3    Multiple Vitamins-Minerals (CENTRUM ADULTS PO) Take 1 tablet by mouth      propranolol (INDERAL) 10 MG tablet Take 1 tablet (10mg) by mouth daily as needed. 90 tablet 0    propranolol (INDERAL) 20 MG tablet Take 1 tablet (20 mg) by mouth daily. 90 tablet 3    ranitidine (ZANTAC) 150 MG tablet Take 150 mg by mouth      traZODone (DESYREL) 50 MG tablet Take 1 tablet (50 mg) by mouth at bedtime. 30 tablet 5         Review of Systems  Review  of Systems   Constitutional:  Negative for chills and fever.   HENT:  Negative for ear pain and sore throat.    Eyes:  Negative for pain and visual disturbance.   Respiratory:  Negative for cough and shortness of breath.    Cardiovascular:  Negative for chest pain and palpitations.   Gastrointestinal:  Negative for abdominal pain and vomiting.   Genitourinary:  Negative for dysuria and hematuria.   Musculoskeletal:  Negative for arthralgias and back pain.        Joint pain in back, hips, and knees. No joint swelling.    Skin:  Negative for color change and rash.   Neurological:  Negative for seizures and syncope.   All other systems reviewed and are negative.         Objective    Exam  /72 (BP Location: Right arm, Patient Position: Sitting, Cuff Size: Adult Regular)   Pulse 61   Temp 98.4  F (36.9  C) (Oral)   Resp 14   Ht 1.829 m (6')   Wt 86.6 kg (191 lb)   SpO2 99%   BMI 25.90 kg/m     Estimated body mass index is 25.9 kg/m  as calculated from the following:    Height as of this encounter: 1.829 m (6').    Weight as of this encounter: 86.6 kg (191 lb).    Physical Exam  Vitals and nursing note reviewed.   Constitutional:       General: He is not in acute distress.     Appearance: Normal appearance. He is well-developed and well-groomed. He is not ill-appearing or toxic-appearing.   HENT:      Head: Normocephalic and atraumatic.      Right Ear: Tympanic membrane, ear canal and external ear normal.      Left Ear: Tympanic membrane, ear canal and external ear normal.      Mouth/Throat:      Lips: Pink.      Mouth: Mucous membranes are moist.      Palate: No mass.      Pharynx: Oropharynx is clear. Uvula midline.      Tonsils: No tonsillar exudate or tonsillar abscesses.   Eyes:      General: Lids are normal.         Right eye: No discharge.         Left eye: No discharge.   Neck:      Trachea: Trachea normal.   Cardiovascular:      Rate and Rhythm: Normal rate and regular rhythm.      Heart sounds: S1  normal and S2 normal. No murmur heard.  Pulmonary:      Effort: Pulmonary effort is normal.      Breath sounds: Normal breath sounds and air entry.   Abdominal:      General: Bowel sounds are normal. There is no distension.      Palpations: Abdomen is soft.      Tenderness: There is no abdominal tenderness. There is no guarding or rebound.   Musculoskeletal:      Cervical back: Full passive range of motion without pain and neck supple.      Right lower leg: No edema.      Left lower leg: No edema.   Lymphadenopathy:      Cervical: No cervical adenopathy.   Skin:     General: Skin is warm and dry.      Findings: No lesion or rash.   Neurological:      General: No focal deficit present.      Mental Status: He is alert.      Cranial Nerves: No cranial nerve deficit.      Gait: Gait is intact.      Deep Tendon Reflexes:      Reflex Scores:       Patellar reflexes are 2+ on the right side and 2+ on the left side.  Psychiatric:         Attention and Perception: Attention normal.         Mood and Affect: Mood normal.         Speech: Speech normal.         Signed Electronically by: Valdez Rojo PA-C

## 2025-07-01 LAB
ANA PAT SER IF-IMP: ABNORMAL
ANA SER QL IF: POSITIVE
ANA TITR SER IF: ABNORMAL {TITER}
CCP AB SER IA-ACNC: 1.3 U/ML
DSDNA AB SER-ACNC: 17 IU/ML

## 2025-07-02 ENCOUNTER — RESULTS FOLLOW-UP (OUTPATIENT)
Dept: FAMILY MEDICINE | Facility: CLINIC | Age: 40
End: 2025-07-02

## 2025-07-03 ENCOUNTER — PATIENT OUTREACH (OUTPATIENT)
Dept: CARE COORDINATION | Facility: CLINIC | Age: 40
End: 2025-07-03
Payer: COMMERCIAL

## 2025-07-31 ENCOUNTER — OFFICE VISIT (OUTPATIENT)
Dept: RHEUMATOLOGY | Facility: CLINIC | Age: 40
End: 2025-07-31
Payer: COMMERCIAL

## 2025-07-31 VITALS
OXYGEN SATURATION: 98 % | SYSTOLIC BLOOD PRESSURE: 146 MMHG | HEIGHT: 72 IN | WEIGHT: 192.6 LBS | BODY MASS INDEX: 26.09 KG/M2 | DIASTOLIC BLOOD PRESSURE: 85 MMHG | HEART RATE: 52 BPM

## 2025-07-31 DIAGNOSIS — G89.29 CHRONIC BILATERAL LOW BACK PAIN WITHOUT SCIATICA: Primary | ICD-10-CM

## 2025-07-31 DIAGNOSIS — M54.50 CHRONIC BILATERAL LOW BACK PAIN WITHOUT SCIATICA: Primary | ICD-10-CM

## 2025-07-31 DIAGNOSIS — M25.552 HIP PAIN, BILATERAL: ICD-10-CM

## 2025-07-31 DIAGNOSIS — R76.8 POSITIVE ANA (ANTINUCLEAR ANTIBODY): ICD-10-CM

## 2025-07-31 DIAGNOSIS — M70.52 PES ANSERINUS BURSITIS OF LEFT KNEE: ICD-10-CM

## 2025-07-31 DIAGNOSIS — M25.50 MULTIPLE JOINT PAIN: ICD-10-CM

## 2025-07-31 DIAGNOSIS — M25.551 HIP PAIN, BILATERAL: ICD-10-CM

## 2025-07-31 NOTE — PROGRESS NOTES
This document was created using a software with less than 100% fidelity, at times resulting in unintended, even erroneous syntax and grammar.  The reader is advised to keep this under consideration while reviewing, interpreting this note.      Rheumatology Consult Note      Avery Ryan     YOB: 1985 Age: 39 year old    Date of visit: 7/31/25    PCP: Valdez Rojo    Chief Complaint   Left knee pain, low back and hip pain, positive LISS, positive dsDNA (Magy)          Assessment and Plan   1.  We will check the more accurate crithidia assay for dsDNA antibody.  If the crithidia dsDNA is negative then the Aarti test is a false positive.    2.  Avery does not have any significant features to suggest lupus (no rashes, no Raynaud's, no photosensitivity, no mouth sores, CBC normal).  We will check additional serologies which are more specific i.e. Bender, RNP, SSA and SSB and if those are negative then the positive LISS test is not clinically significant.We discussed the LISS test, its prevalence in the normal population (10-15%, increased prevalence with aging), the nonspecific nature of the LISS test and its association with rheumatologic diseases as well as mild rheumatologic conditions (infection, malignancy, seen in 50% of autoimmune thyroid disorders etc.) An isolated LISS is NOT specific for any single disease and if the symptoms warrant, the need for more specific blood tests discussed to confirm or rule out certain rheumatologic disorders and refine the diagnostic process.    3.  Left knee pain.  We reviewed his knee x-ray which is normal.  The knee hurts primarily when he jogs, he does have tenderness along the anserine bursa which may be the cause of his knee pain.  He will read about anserine bursitis and let us know if he would be interested in having the bursa injected when he returns in a couple of weeks.    4.  He also has chronic low back and hip pain.  Will check HLA-B27 to screen  "for spondylitis, x-rays of the sacroiliac joints ordered.    Follow-up 2 weeks.    CC PCP            APARNA Varela is a very nice 37-year-old male referred to rheumatology for a positive LISS and dsDNA antibody (Magy assay).  His main musculoskeletal complaints are pain in the left knee since 2023 which started after he was jogging.  He also gives a history of bilateral hip and low back pain for which he sees a chiropractor for the past 4 years.  He does not have rashes, no photosensitivity, no history of unexplained cytopenias, no history of butterfly rash or oral ulcers.  There is no history of iritis/uveitis.  He does not have psoriasis.  He denies any pain or swelling in his fingers, wrists.  No Raynaud's type color changes on cold exposure.  His grandmother may have \"lupus \", he will check with her if she is on hydroxychloroquine.  He used to have dry mouth symptoms but they have resolved completely.  He saw another rheumatologist a year ago for similar symptoms and LISS and was reassured he did not have lupus.  His main symptoms involve his low back, hips and the left knee.  The left knee hurts mostly medially on jogging and he has cut back on jogging.  The knee does not hurt when he bikes, which she bikes a fair amount.  There is no swelling or redness of the knee joint.  His knee x-ray is reviewed and looks normal.  For pain relief he takes ibuprofen very occasionally (generally likes to avoid meds).  His left knee pain is quite focally localized to the anserine bursa and we discussed that.  He would like to think about injecting the bursa and let us know next time after reading about anserine bursitis.  Very low back, hip pain has been going on for about 4 years, he sees a chiropractor and manipulation helps.  The back pain is 2-3/10 most days but can exacerbate occasionally.  It does not radiate into his lower extremities.    Labs: LISS positive, RF negative, CCP negative, CRP 3, ESR 5, Aarti dsDNA positive, " WBC 6.3, hemoglobin 14.5, platelets 298, creatinine 0.95.    Social history.  He is , works full-time (manager), they have 4 young kids.  No tobacco, alcohol about 6 drinks a week.  Does some light weights and cardio 5 days a week.             Active Problem List     Patient Active Problem List   Diagnosis    Gastric reflux    WPW (Sylvie-Parkinson-White syndrome)    Anxiety    Systemic lupus erythematosus, unspecified SLE type, unspecified organ involvement status (H)     Past Medical History     Past Medical History:   Diagnosis Date    WPW (Sylvie-Parkinson-White syndrome) 5/10/2019     Past Surgical History     Past Surgical History:   Procedure Laterality Date    CARDIAC SURGERY  August 2019    Ablation for WPW     Allergy   No Known Allergies  Current Medication List     Current Outpatient Medications   Medication Sig Dispense Refill    busPIRone (BUSPAR) 15 MG tablet Take 1 tablet (15 mg) by mouth 2 times daily. 180 tablet 3    Multiple Vitamins-Minerals (CENTRUM ADULTS PO) Take 1 tablet by mouth      propranolol (INDERAL) 10 MG tablet Take 1 tablet (10mg) by mouth daily as needed. 90 tablet 0    propranolol (INDERAL) 20 MG tablet Take 1 tablet (20 mg) by mouth daily. 90 tablet 3    ranitidine (ZANTAC) 150 MG tablet Take 150 mg by mouth      traZODone (DESYREL) 50 MG tablet Take 1 tablet (50 mg) by mouth at bedtime. 30 tablet 5     No current facility-administered medications for this visit.       No current facility-administered medications for this visit.        Family History     Family History   Problem Relation Age of Onset    Depression Father     Anxiety Disorder Father          Physical Exam     COMPREHENSIVE EXAMINATION:  Vitals:    07/31/25 1306   BP: (!) 146/85   BP Location: Right arm   Patient Position: Sitting   Cuff Size: Adult Regular   Pulse: 52   SpO2: 98%   Weight: 87.4 kg (192 lb 9.6 oz)   Height: 1.829 m (6')     Patient is alert and oriented x 3.    Head/neck: No butterfly rash, no  jaundice, no conjunctival pallor, no ocular redness, no facial asymmetry, no enlargement of the major salivary glands, no oral ulcers    Lungs: clear to auscultation, no crackles or wheezing    Heart sounds :regular    Abdomen: Soft, nontender    Musculoskeletal:    Right hand: No synovitis or tenderness of 1-5 MCP joints, no synovitis or tenderness of 1-5 PIP joints.  No swan-neck or boutonniere deformities    Left hand: No synovitis or tenderness of 1-5 MCP joints, no synovitis or tenderness of 1-5 PIP joints, no swan-neck or boutonniere deformities    Right wrist: no synovitis,no tenderness    Left wrist: No tenderness, no synovitis    Elbows: Full range of motion, no swelling or synovitis    Right shoulder: Normal abduction, internal and external rotation    Left shoulder: Normal abduction, internal and external rotation    Cervical spine: Normal flexion, lateral rotation bilaterally full    Spine: No alignment abnormalities noted    Right hip:Full  Flexion internal and external rotation    Left hip: Full flexion, internal and external rotation    Right knee: no effusion, no redness, full ROM    Left knee: No effusion, no redness, full ROM,++ tenderness in the anserine bursa noted.    Rt ankle: No swelling, redness or tenderness    Left ankle: No swelling, redness or tenderness    Right foot: No swelling, redness or tenderness of the toes/MTPs    Left foot: No swelling, redness or tenderness of the toes/MTP joints          Labs / Imaging (select studies)     Rheumatoid Factor   Date Value Ref Range Status   06/30/2025 <10 <14 IU/mL Final   06/13/2023 <7 <12 IU/mL Final     C3 Complement   Date Value Ref Range Status   06/13/2023 101 81 - 157 mg/dL Final     C4 Complement   Date Value Ref Range Status   06/13/2023 22 13 - 39 mg/dL Final      Hemoglobin   Date Value Ref Range Status   06/30/2025 14.5 13.3 - 17.7 g/dL Final     Urea Nitrogen   Date Value Ref Range Status   06/30/2025 15.7 6.0 - 20.0 mg/dL Final    06/13/2023 16.8 6.0 - 20.0 mg/dL Final     Erythrocyte Sedimentation Rate   Date Value Ref Range Status   06/30/2025 5 0 - 15 mm/hr Final   06/13/2023 5 0 - 15 mm/hr Final     AST   Date Value Ref Range Status   06/30/2025 25 0 - 45 U/L Final     Albumin   Date Value Ref Range Status   06/30/2025 4.7 3.5 - 5.2 g/dL Final     Alkaline Phosphatase   Date Value Ref Range Status   06/30/2025 50 40 - 150 U/L Final     ALT   Date Value Ref Range Status   06/30/2025 21 0 - 70 U/L Final     Rheumatoid Factor   Date Value Ref Range Status   06/30/2025 <10 <14 IU/mL Final   06/13/2023 <7 <12 IU/mL Final          Immunization History     Immunization History   Administered Date(s) Administered    COVID-19 12+ (Pfizer) 10/26/2023, 11/25/2024    COVID-19 Bivalent 12+ (Pfizer) 10/17/2022    COVID-19 MONOVALENT 12+ (Pfizer) 11/15/2021    COVID-19 Vaccine ([employer] 04/10/2021    Hep B, Adult (Heplisav- B) 11/25/2024    INFLUENZA,TRIVALENT (FLUCELVAX) 11/25/2024    Influenza (IIV3) PF 09/21/2013, 12/06/2014    Influenza (prior to 2024) 02/02/2018    Influenza Vaccine 18-64 (Flublok) 11/26/2019    Influenza Vaccine >6 months,quad, PF 11/09/2020, 10/17/2022    Influenza,INJ,MDCK,PF,Quad >6mo(Flucelvax) 10/18/2021, 10/26/2023    TDAP (Adacel,Boostrix) 04/25/2023    TDAP Vaccine (Boostrix) 01/04/2013    Td (Adult), Adsorbed 07/14/2003

## 2025-08-14 ENCOUNTER — TELEPHONE (OUTPATIENT)
Dept: RHEUMATOLOGY | Facility: CLINIC | Age: 40
End: 2025-08-14
Payer: COMMERCIAL